# Patient Record
Sex: MALE | Race: WHITE
[De-identification: names, ages, dates, MRNs, and addresses within clinical notes are randomized per-mention and may not be internally consistent; named-entity substitution may affect disease eponyms.]

---

## 2019-04-12 ENCOUNTER — HOSPITAL ENCOUNTER (OUTPATIENT)
Dept: HOSPITAL 96 - M.LAB | Age: 69
End: 2019-04-12
Attending: FAMILY MEDICINE
Payer: COMMERCIAL

## 2019-04-12 DIAGNOSIS — K57.30: ICD-10-CM

## 2019-04-12 DIAGNOSIS — Z80.3: ICD-10-CM

## 2019-04-12 DIAGNOSIS — Z82.49: ICD-10-CM

## 2019-04-12 DIAGNOSIS — I10: ICD-10-CM

## 2019-04-12 DIAGNOSIS — Z87.891: ICD-10-CM

## 2019-04-12 DIAGNOSIS — Z80.0: ICD-10-CM

## 2019-04-12 DIAGNOSIS — F41.9: ICD-10-CM

## 2019-04-12 DIAGNOSIS — J44.9: ICD-10-CM

## 2019-04-12 DIAGNOSIS — K76.9: Primary | ICD-10-CM

## 2019-04-12 DIAGNOSIS — N40.0: ICD-10-CM

## 2019-04-12 DIAGNOSIS — K40.20: ICD-10-CM

## 2019-04-12 DIAGNOSIS — M47.816: ICD-10-CM

## 2019-04-12 DIAGNOSIS — Z79.899: ICD-10-CM

## 2019-04-12 LAB
ABSOLUTE MONOCYTES: 2.1 THOU/UL (ref 0–1.2)
ALBUMIN SERPL-MCNC: 3.9 G/DL (ref 3.4–5)
ALP SERPL-CCNC: 80 U/L (ref 46–116)
ALT SERPL-CCNC: 32 U/L (ref 30–65)
AMYLASE SERPL-CCNC: 104 U/L (ref 25–115)
ANION GAP SERPL CALC-SCNC: 7 MMOL/L (ref 7–16)
ANISOCYTOSIS BLD QL SMEAR: (no result)
AST SERPL-CCNC: 15 U/L (ref 15–37)
BILIRUB SERPL-MCNC: 1.6 MG/DL
BUN SERPL-MCNC: 24 MG/DL (ref 7–18)
CALCIUM SERPL-MCNC: 9.4 MG/DL (ref 8.5–10.1)
CHLORIDE SERPL-SCNC: 104 MMOL/L (ref 98–107)
CO2 SERPL-SCNC: 30 MMOL/L (ref 21–32)
CREAT SERPL-MCNC: 1.3 MG/DL (ref 0.6–1.3)
GLUCOSE SERPL-MCNC: 105 MG/DL (ref 70–99)
GRANULOCYTES NFR BLD MANUAL: 9 %
HCT VFR BLD CALC: 48.3 % (ref 42–52)
HGB BLD-MCNC: 15.7 GM/DL (ref 14–18)
LIPASE: 804 U/L (ref 73–393)
LYMPHOCYTES # BLD: 62.4 THOU/UL (ref 0.8–5.3)
LYMPHOCYTES NFR BLD AUTO: 84 %
MCH RBC QN AUTO: 29.2 PG (ref 26–34)
MCHC RBC AUTO-ENTMCNC: 32.4 G/DL (ref 28–37)
MCV RBC: 89.9 FL (ref 80–100)
MONOCYTES NFR BLD: 3 %
MPV: 7.1 FL. (ref 7.2–11.1)
NEUTROPHILS # BLD: 6.4 THOU/UL (ref 1.6–8.1)
NUCLEATED RBCS: 0 /100WBC
PLATELET # BLD EST: ADEQUATE 10*3/UL
PLATELET COUNT*: 172 THOU/UL (ref 150–400)
POIKILOCYTOSIS BLD QL SMEAR: (no result)
POTASSIUM SERPL-SCNC: 4.2 MMOL/L (ref 3.5–5.1)
PROT SERPL-MCNC: 7.6 G/DL (ref 6.4–8.2)
RBC # BLD AUTO: 5.37 MIL/UL (ref 4.5–6)
RDW-CV: 14.1 % (ref 10.5–14.5)
SODIUM SERPL-SCNC: 141 MMOL/L (ref 136–145)
VARIANT LYMPHS NFR BLD MANUAL: 4 %
WBC # BLD AUTO: 70.9 THOU/UL (ref 4–11)

## 2019-04-16 ENCOUNTER — HOSPITAL ENCOUNTER (INPATIENT)
Dept: HOSPITAL 96 - M.2W | Age: 69
LOS: 4 days | Discharge: HOME | DRG: 439 | End: 2019-04-20
Attending: HOSPITALIST | Admitting: HOSPITALIST
Payer: COMMERCIAL

## 2019-04-16 VITALS — SYSTOLIC BLOOD PRESSURE: 155 MMHG | DIASTOLIC BLOOD PRESSURE: 86 MMHG

## 2019-04-16 VITALS — WEIGHT: 164 LBS | BODY MASS INDEX: 22.21 KG/M2 | HEIGHT: 72.01 IN

## 2019-04-16 VITALS — SYSTOLIC BLOOD PRESSURE: 164 MMHG | DIASTOLIC BLOOD PRESSURE: 99 MMHG

## 2019-04-16 DIAGNOSIS — K82.4: ICD-10-CM

## 2019-04-16 DIAGNOSIS — E80.6: ICD-10-CM

## 2019-04-16 DIAGNOSIS — E44.1: ICD-10-CM

## 2019-04-16 DIAGNOSIS — N18.3: ICD-10-CM

## 2019-04-16 DIAGNOSIS — F90.9: ICD-10-CM

## 2019-04-16 DIAGNOSIS — F13.10: ICD-10-CM

## 2019-04-16 DIAGNOSIS — K85.90: Primary | ICD-10-CM

## 2019-04-16 DIAGNOSIS — F32.9: ICD-10-CM

## 2019-04-16 DIAGNOSIS — H91.90: ICD-10-CM

## 2019-04-16 DIAGNOSIS — Z80.0: ICD-10-CM

## 2019-04-16 DIAGNOSIS — Z79.899: ICD-10-CM

## 2019-04-16 DIAGNOSIS — F11.10: ICD-10-CM

## 2019-04-16 DIAGNOSIS — I12.9: ICD-10-CM

## 2019-04-16 DIAGNOSIS — Z85.6: ICD-10-CM

## 2019-04-16 DIAGNOSIS — F41.9: ICD-10-CM

## 2019-04-16 DIAGNOSIS — J44.9: ICD-10-CM

## 2019-04-16 DIAGNOSIS — F12.10: ICD-10-CM

## 2019-04-16 DIAGNOSIS — Z87.891: ICD-10-CM

## 2019-04-16 LAB
ABSOLUTE MONOCYTES: 1.5 THOU/UL (ref 0–1.2)
ALBUMIN SERPL-MCNC: 3.9 G/DL (ref 3.4–5)
ALP SERPL-CCNC: 77 U/L (ref 46–116)
ALT SERPL-CCNC: 27 U/L (ref 30–65)
ANION GAP SERPL CALC-SCNC: 10 MMOL/L (ref 7–16)
APTT BLD: 26.8 SECONDS (ref 25–31.3)
AST SERPL-CCNC: 17 U/L (ref 15–37)
BACTERIA-REFLEX: (no result) /HPF
BENZODIAZ UR-MCNC: POSITIVE UG/L
BILIRUB SERPL-MCNC: 1.5 MG/DL
BILIRUB UR-MCNC: NEGATIVE MG/DL
BUN SERPL-MCNC: 20 MG/DL (ref 7–18)
CALCIUM SERPL-MCNC: 8.9 MG/DL (ref 8.5–10.1)
CHLORIDE SERPL-SCNC: 103 MMOL/L (ref 98–107)
CO2 SERPL-SCNC: 25 MMOL/L (ref 21–32)
COLOR UR: YELLOW
CREAT SERPL-MCNC: 1.2 MG/DL (ref 0.6–1.3)
GLUCOSE SERPL-MCNC: 113 MG/DL (ref 70–99)
GRANULOCYTES NFR BLD MANUAL: 9 %
HCT VFR BLD CALC: 45.6 % (ref 42–52)
HGB BLD-MCNC: 15.2 GM/DL (ref 14–18)
INR PPP: 1
KETONES UR STRIP-MCNC: (no result) MG/DL
LIPASE: 154 U/L (ref 73–393)
LYMPHOCYTES # BLD: 64.7 THOU/UL (ref 0.8–5.3)
LYMPHOCYTES NFR BLD AUTO: 89 %
MAGNESIUM SERPL-MCNC: 2 MG/DL (ref 1.8–2.4)
MCH RBC QN AUTO: 29.8 PG (ref 26–34)
MCHC RBC AUTO-ENTMCNC: 33.3 G/DL (ref 28–37)
MCV RBC: 89.5 FL (ref 80–100)
MONOCYTES NFR BLD: 2 %
MPV: 7.3 FL. (ref 7.2–11.1)
NEUTROPHILS # BLD: 6.5 THOU/UL (ref 1.6–8.1)
NUCLEATED RBCS: 0 /100WBC
OPIATES UR-MCNC: POSITIVE NG/ML
PLATELET # BLD EST: ADEQUATE 10*3/UL
PLATELET COUNT*: 163 THOU/UL (ref 150–400)
POTASSIUM SERPL-SCNC: 3.7 MMOL/L (ref 3.5–5.1)
PROT SERPL-MCNC: 7.3 G/DL (ref 6.4–8.2)
PROT UR QL STRIP: (no result)
PROTHROMBIN TIME: 10.5 SECONDS (ref 9.2–11.5)
RBC # BLD AUTO: 5.1 MIL/UL (ref 4.5–6)
RBC # UR STRIP: (no result) /UL
RBC MORPH BLD: NORMAL
RDW-CV: 13.9 % (ref 10.5–14.5)
SODIUM SERPL-SCNC: 138 MMOL/L (ref 136–145)
SP GR UR STRIP: 1.02 (ref 1–1.03)
SQUAMOUS: (no result) /LPF (ref 0–3)
THC: POSITIVE
URINE CLARITY: CLEAR
URINE GLUCOSE-RANDOM: NEGATIVE
URINE LEUKOCYTES-REFLEX: NEGATIVE
URINE NITRITE-REFLEX: NEGATIVE
UROBILINOGEN UR STRIP-ACNC: 0.2 E.U./DL (ref 0.2–1)
WBC # BLD AUTO: 72.7 THOU/UL (ref 4–11)

## 2019-04-16 NOTE — NUR
PT ARRIVED ON UNIT AT APPROX 1105 VIA WC, PT DIRECT ADMIT FROM DR TEE D/T
LABWORK INDICATING PANCREATITIS. PT A&O X4, VSS, CARDIAC MONITOR TRACING SINUS
RHYTHM/SINUS MARIA INES. LS DIMINISHED IN ALL LOBES, RA. PT C/O RIGHT UPPER QUAD
PAIN 9/10, DR PEARCE NOTIFIED FOR NEW ORDERS. PT UP AD HARMEET TO BATHROOM.
ORIENTED TO CALL LIGHT AND ROOM.

## 2019-04-16 NOTE — CON
[address]  [address]                    CONSULTATION                  
_______________________________________________________________________________
 
Name:       CANDACE PULIDO         Room:           [address] [place]  
M.R.#:  [id number]      Account #:      [id number]  
Admission:  04/16/19     Attend Phys:    Urbano Neal MD    
Discharge:               Date of Birth:  [birth year]  
         Report #: [id number]
                                                                     [id number]  
_______________________________________________________________________________
THIS REPORT FOR:  //name//                      
 
CC: Urbano Aguiar DO
 
DICTATED BY: Dottie Helton [facility]
 
DATE OF SERVICE:  04/18/2019
 
 
Please note at the time of this dictation, the patient was seen and physically
examined by myself.
 
REASON FOR CONSULTATION:  Post-perennial pain.
 
HISTORY OF PRESENT ILLNESS:  This 68-year-old male who presented to the
Emergency Room after being told by his PCP that he had an elevated lipase level
from 04/14/2019, it was 804.  He states he has been having some abdominal
discomfort, which he states was usually after he ate.  He denied any nausea or
vomiting associated with this except for on Sunday, which he states was
non-coffee ground emesis or any bright red blood.  He denies getting full
quickly prior to all of this when he is eating.  The patient did undergo an
ultrasound, which was negative.  It did show some gallbladder polyps and then he
had a CCK PIPIDA that showed an EF of 39%.  The patient denies any chronic use
of any NSAIDs.  He has never had any stomach issues.  He had a colonoscopy done
last year in 2017 that just showed some hemorrhoids and diverticular disease,
otherwise negative.  He has never had an upper scope done that he can recall at
this time.
 
ALLERGIES:  No known drug allergies.
 
MEDICATIONS:  From home, see MAR.
 
PAST MEDICAL HISTORY:  COPD, hypertension, CLL, hearing impairment, depression,
anxiety, ADHD.
 
PAST SURGICAL HISTORY:  He has had a right cochlear implant and ____ surgery.
 
FAMILY HISTORY:  Mother, colon cancer.
 
SOCIAL HISTORY:  Quit smoking about 7 years ago.  Alcohol every couple of
months.  He denies any illegal drug use; however, he did test positive for THC.
 
REVIEW OF SYSTEMS:  Twelve-point review of systems is essentially negative
except what is mentioned in the HPI.
 
 
 
[place] [address]                    CONSULTATION                  
_______________________________________________________________________________
 
Name:       CANDACE PULIDO         Room:           [address] [place]  
[facility]#:  [id number]      Account #:      [id number]  
Admission:  04/16/19     Attend Phys:    Urbano Neal MD    
Discharge:               Date of Birth:  [birth year]  
         Report #: [id number]
                                                                     [id number]  
_______________________________________________________________________________
 
PHYSICAL EXAMINATION:
VITAL SIGNS:  Temperature 37.3, pulse 64, respirations 18, blood pressure
162/92.
HEART:  Regular rate and rhythm.
LUNGS:  Clear.
ABDOMEN:  Soft, positive bowel sounds in all 4 quadrants with some right upper
quadrant tenderness noted to palpation.
 
LABORATORY DATA:  Hemoglobin is 13.9, white count is 63,000, platelets 144.  PT
10.5, INR 1.2.  Total bilirubin is 2.3, alkaline phosphatase 69, ALT 19, AST is
15, direct bilirubin is 0.1.
 
IMPRESSION:
1.  Postprandial right upper quadrant pain.
2.  Pancreatitis, resolved.
3.  Elevated bilirubin.
4.  Gallbladder polyps.
5.  Chronic lymphocytic leukemia.
6.  Family history, mother colon cancer.
 
PLAN:
1.  EGD tomorrow with Dr. Disla.
2.  Regular diet.
3.  May consider a GET.
4.  Further recommendations will be made once the procedure has been performed.
 
Thank you for allowing us to participate in this patient's care.  Please do not
hesitate to call with any questions in regard to this consult.
 
ADDENDUM
 
This is a 68-year-old male who was seen and examined by myself.  I also have
reviewed labs and imaging studies.  The patient reports that he started having
acute epigastric pain with symptoms of nausea.  This prompted him to come to the
hospital.  Prior to admission, he had lipase of 800.  This had corrected by time
of admission.
 
CT of abdomen and pelvis also had shown some evidence of gallstone without any
evidence of acute cholecystitis.  The patient's liver enzymes were normal.
 
The patient reports that his pain is somewhat better, but not resolved.  We will
consider performing upper endoscopy to rule out other etiologies of dyspepsia
and pain such as H. pylori, peptic ulcer disease, esophagitis or duodenitis.  We
 
 
 
[place] [address]                    CONSULTATION                  
_______________________________________________________________________________
 
Name:       CANDACE PULIDO         Room:           [address] [place]  
.R.#:  [id number]      Account #:      [id number]  
Admission:  04/16/19     Attend Phys:    Urbano Neal MD    
Discharge:               Date of Birth:  [birth year]  
         Report #: [id number]
                                                                     [id number]  
_______________________________________________________________________________
will make further recommendation based on finding.  We believe that his
pancreatitis was due to a gallstone, which has been passed.
 
 
 
 
 
 
 
 
 
 
 
 
 
 
 
 
 
 
 
 
 
 
 
 
 
 
 
 
 
 
 
 
 
 
 
 
 
 
 
 
 
 
                       
                                        By:                                
                 
D: 04/18/19 1257_______________________________________
T: 04/19/19 0630Aminata Johnson MD               /nt

## 2019-04-16 NOTE — PROC
[facility] 
[address] [place]  [address]                    PROCEDURE REPORT              
_______________________________________________________________________________
 
Name:       CANDACE PULIDO         Room:           [address] [place]  
M.R.#:  [id number]      Account #:      [id number]  
Admission:  04/16/19     Attend Phys:    Urbano Neal MD    
Discharge:  04/20/19     Date of Birth:  [birth year]  
         Report #: [id number]
                                                                                
_______________________________________________________________________________
THIS REPORT FOR:  //name//                      
 
For GI report, please see the Provation report in Perceptive 7 content.
 
 
 
 
 
 
 
 
 
 
 
 
 
 
 
 
 
 
 
 
 
 
 
 
 
 
 
 
 
 
 
 
 
 
 
 
 
 
 
 
 
                       
                                        By:                                
                 
D: 04/19/19     _______________________________________
T: 04/25/19 0643Medical Records Staff NAOMI       /AL

## 2019-04-17 VITALS — DIASTOLIC BLOOD PRESSURE: 83 MMHG | SYSTOLIC BLOOD PRESSURE: 163 MMHG

## 2019-04-17 VITALS — DIASTOLIC BLOOD PRESSURE: 88 MMHG | SYSTOLIC BLOOD PRESSURE: 161 MMHG

## 2019-04-17 VITALS — DIASTOLIC BLOOD PRESSURE: 92 MMHG | SYSTOLIC BLOOD PRESSURE: 154 MMHG

## 2019-04-17 VITALS — SYSTOLIC BLOOD PRESSURE: 158 MMHG | DIASTOLIC BLOOD PRESSURE: 88 MMHG

## 2019-04-17 VITALS — DIASTOLIC BLOOD PRESSURE: 88 MMHG | SYSTOLIC BLOOD PRESSURE: 142 MMHG

## 2019-04-17 VITALS — DIASTOLIC BLOOD PRESSURE: 88 MMHG | SYSTOLIC BLOOD PRESSURE: 158 MMHG

## 2019-04-17 LAB
ABSOLUTE BASOPHILS: 0.1 THOU/UL (ref 0–0.2)
ABSOLUTE EOSINOPHILS: 0.1 THOU/UL (ref 0–0.7)
ABSOLUTE MONOCYTES: 1.3 THOU/UL (ref 0–1.2)
ALBUMIN SERPL-MCNC: 3.3 G/DL (ref 3.4–5)
ALP SERPL-CCNC: 62 U/L (ref 46–116)
ALT SERPL-CCNC: 19 U/L (ref 30–65)
ANION GAP SERPL CALC-SCNC: 7 MMOL/L (ref 7–16)
AST SERPL-CCNC: 15 U/L (ref 15–37)
BACTERIA #/AREA URNS HPF: (no result) /HPF
BASOPHILS NFR BLD AUTO: 0.2 %
BILIRUB SERPL-MCNC: 2 MG/DL
BILIRUB UR-MCNC: NEGATIVE MG/DL
BUN SERPL-MCNC: 16 MG/DL (ref 7–18)
CALCIUM SERPL-MCNC: 8.5 MG/DL (ref 8.5–10.1)
CHLORIDE SERPL-SCNC: 105 MMOL/L (ref 98–107)
CHOLEST SERPL-MCNC: 107 MG/DL (ref ?–200)
CO2 SERPL-SCNC: 26 MMOL/L (ref 21–32)
COLOR UR: YELLOW
CREAT SERPL-MCNC: 1.1 MG/DL (ref 0.6–1.3)
EOSINOPHIL NFR BLD: 0.2 %
GLUCOSE SERPL-MCNC: 104 MG/DL (ref 70–99)
GRANULOCYTES NFR BLD MANUAL: 6 %
HCT VFR BLD CALC: 41.9 % (ref 42–52)
HDLC SERPL-MCNC: 40 MG/DL (ref 40–?)
HGB BLD-MCNC: 13.9 GM/DL (ref 14–18)
KETONES UR STRIP-MCNC: NEGATIVE MG/DL
LDLC SERPL-MCNC: 51 MG/DL (ref ?–100)
LYMPHOCYTES # BLD: 57.6 THOU/UL (ref 0.8–5.3)
LYMPHOCYTES NFR BLD AUTO: 91.5 %
MCH RBC QN AUTO: 29.8 PG (ref 26–34)
MCHC RBC AUTO-ENTMCNC: 33.3 G/DL (ref 28–37)
MCV RBC: 89.6 FL (ref 80–100)
MONOCYTES NFR BLD: 2.1 %
MPV: 7.6 FL. (ref 7.2–11.1)
NEUTROPHILS # BLD: 3.8 THOU/UL (ref 1.6–8.1)
NITRITE UR QL STRIP: NEGATIVE
NUCLEATED RBCS: 0 /100WBC
PLATELET COUNT*: 144 THOU/UL (ref 150–400)
POTASSIUM SERPL-SCNC: 4.1 MMOL/L (ref 3.5–5.1)
PROT SERPL-MCNC: 6.3 G/DL (ref 6.4–8.2)
PROT UR QL STRIP: NEGATIVE
RBC # BLD AUTO: 4.67 MIL/UL (ref 4.5–6)
RBC # UR STRIP: (no result) /UL
RBC #/AREA URNS HPF: (no result) /HPF (ref 0–2)
RDW-CV: 13.6 % (ref 10.5–14.5)
SODIUM SERPL-SCNC: 138 MMOL/L (ref 136–145)
SP GR UR STRIP: <= 1.005 (ref 1–1.03)
SQUAMOUS: (no result) /LPF (ref 0–3)
TC:HDL: 2.7 RATIO
TRIGL SERPL-MCNC: 80 MG/DL (ref ?–150)
URINE CLARITY: CLEAR
URINE GLUCOSE-RANDOM: NEGATIVE
URINE LEUKOCYTES: NEGATIVE
UROBILINOGEN UR STRIP-ACNC: 0.2 E.U./DL (ref 0.2–1)
VLDLC SERPL CALC-MCNC: 16 MG/DL (ref ?–40)
WBC # BLD AUTO: 63 THOU/UL (ref 4–11)
WBC #/AREA URNS HPF: (no result) /HPF (ref 0–5)

## 2019-04-17 NOTE — NUR
Pt is A&O. Resides at home with his girlfriend. Normally active and
independent. No DME. No hx of HH or SNF. Pt states that he may have to have
his gallbladder removed, during this hospital stay. Goal is home at dc, no
needs anticipated. Following

## 2019-04-17 NOTE — NUR
ASSUMED PT CARE AT APPROX 1930. PT IS AWAKE AND ORIENTED X4. VSS ON ROOM AIR.
CARDIAC MONITOR IN PLACE TRACING SR/SB. PT IS HARD OF HEARING. DENIES SOA AND
CHEST PAIN.  RE-ASSESSMENT DONE AND CHARTED. PT C/O RUQ ABDOMINAL PAIN
RELIEVED BY PAIN MEDS GIVEN PER MAR. PT WAS ABLE TO SLEEP THROUGH THE NIGHT.
CALL LIGHT WITHIN REACH. HOURLY ROUNDING DONE FOR PT SAFETY.

## 2019-04-17 NOTE — EKG
[place] [address]
Phone:  [phone]                     ELECTROCARDIOGRAM REPORT      
_______________________________________________________________________________
 
Name:       CANDACE PULIDO         Room:           [address]    [place]  
M.R.#:  [id number]      Account #:      [id number]  
Admission:  19     Attend Phys:    Urbano Neal MD    
Discharge:               Date of Birth:  [birth year]  
         Report #: [id number]
    [id number]
_______________________________________________________________________________
THIS REPORT FOR:  //name//                      
 
                          [facility]
                                       
Test Date:    2019               Test Time:    14:38:30
Pat Name:     CANDACE PULIDO           Department:   
Patient ID:   [id number]            Room:         [address]
Gender:       M                        Technician:   [facility]
:          1950               Requested By: Urbano Neal
Order Number: [id number]    Umm MD:   Jeremy Booker
                                 Measurements
Intervals                              Axis          
Rate:         61                       P:            30
AZ:           149                      QRS:          10
QRSD:         117                      T:            -20
QT:           472                                    
QTc:          476                                    
                           Interpretive Statements
Sinus rhythm
Nonspecific intraventricular conduction delay
Borderline repolarization abnormality
No previous ECG available for comparison
 
Electronically Signed On 2019 14:32:14 CDT by Jeremy Booker
[email or web address]
 
 
 
 
 
 
 
 
 
 
 
 
 
 
 
 
 
 
  <ELECTRONICALLY SIGNED>
                                           By: Jeremy Booker MD, [facility]     
  19     1432
D: 19 1438   _____________________________________
T: 19 1438   Jeremy oBoker MD, FACC       /EPI

## 2019-04-17 NOTE — NUR
PT IN BED, CALL LIGHT IN REACH. A&O X4, UP AD HARMEET, CARDIAC MONITOR TRACING
SINUS MARIA INES. CONT TO C/O ABD PAIN IN RIGHT UPPER QUAD, PT EDUCATED ON US
FINDINGS, CONT ON CLEAR LIQUID DIET, PRN PAIN MEDS ON BOARD. WILL CONT POC.

## 2019-04-17 NOTE — NUR
PT A&O X4, PIPIDA SCAN CLEAR, DENIES ANY N/V, C/O SLIGHT PAIN TO RIGHT UPPER
QUAD IN AM, PRN PAIN MEDS ON BOARD. UP AD HARMEET, VSS, CARDIAC MONITOR TRACING
SINUS MARIA INES. HOURLY ROUNDING COMPLETED, PER DR MARTÍNEZ, PT REMAINS ON FULL
LIQUID DIET.

## 2019-04-18 VITALS — SYSTOLIC BLOOD PRESSURE: 138 MMHG | DIASTOLIC BLOOD PRESSURE: 85 MMHG

## 2019-04-18 VITALS — SYSTOLIC BLOOD PRESSURE: 154 MMHG | DIASTOLIC BLOOD PRESSURE: 88 MMHG

## 2019-04-18 VITALS — SYSTOLIC BLOOD PRESSURE: 162 MMHG | DIASTOLIC BLOOD PRESSURE: 92 MMHG

## 2019-04-18 VITALS — DIASTOLIC BLOOD PRESSURE: 93 MMHG | SYSTOLIC BLOOD PRESSURE: 150 MMHG

## 2019-04-18 VITALS — SYSTOLIC BLOOD PRESSURE: 166 MMHG | DIASTOLIC BLOOD PRESSURE: 102 MMHG

## 2019-04-18 VITALS — DIASTOLIC BLOOD PRESSURE: 84 MMHG | SYSTOLIC BLOOD PRESSURE: 150 MMHG

## 2019-04-18 LAB
ALBUMIN SERPL-MCNC: 3.6 G/DL (ref 3.4–5)
ALP SERPL-CCNC: 69 U/L (ref 46–116)
ALT SERPL-CCNC: 23 U/L (ref 30–65)
ANION GAP SERPL CALC-SCNC: 8 MMOL/L (ref 7–16)
AST SERPL-CCNC: 14 U/L (ref 15–37)
BILIRUB SERPL-MCNC: 2.3 MG/DL
BUN SERPL-MCNC: 13 MG/DL (ref 7–18)
CALCIUM SERPL-MCNC: 8.5 MG/DL (ref 8.5–10.1)
CHLORIDE SERPL-SCNC: 104 MMOL/L (ref 98–107)
CO2 SERPL-SCNC: 29 MMOL/L (ref 21–32)
CREAT SERPL-MCNC: 1.3 MG/DL (ref 0.6–1.3)
EST. AVERAGE GLUCOSE BLD GHB EST-MCNC: 117 MG/DL
GLUCOSE SERPL-MCNC: 92 MG/DL (ref 70–99)
GLYCOHEMOGLOBIN (HGB A1C): 5.7 % (ref 4.8–5.6)
HCT VFR BLD CALC: 43.1 % (ref 42–52)
HGB BLD-MCNC: 14.2 GM/DL (ref 14–18)
MAGNESIUM SERPL-MCNC: 2.1 MG/DL (ref 1.8–2.4)
MCH RBC QN AUTO: 29.7 PG (ref 26–34)
MCHC RBC AUTO-ENTMCNC: 33.1 G/DL (ref 28–37)
MCV RBC: 89.6 FL (ref 80–100)
MPV: 7 FL. (ref 7.2–11.1)
PHOSPHATE SERPL-MCNC: 3.9 MG/DL (ref 2.5–4.9)
PLATELET COUNT*: 144 THOU/UL (ref 150–400)
POTASSIUM SERPL-SCNC: 4 MMOL/L (ref 3.5–5.1)
PROT SERPL-MCNC: 6.5 G/DL (ref 6.4–8.2)
RBC # BLD AUTO: 4.8 MIL/UL (ref 4.5–6)
RDW-CV: 13.6 % (ref 10.5–14.5)
SODIUM SERPL-SCNC: 141 MMOL/L (ref 136–145)
WBC # BLD AUTO: 61.1 THOU/UL (ref 4–11)

## 2019-04-18 NOTE — NUR
VSS, UP AD HARMEET, HOURLY ROUNDING COMPLETED. NPO AT MIDNIGHT FOR GI PROCEDURE.
DENIES ANY N/V OR PAIN AT THIS TIME.

## 2019-04-18 NOTE — NUR
ASSUMED PT CARE AT APPROX 1930. PT IS AWAKE AND ORIENTED X4. VSS ON ROOM AIR.
CARDIAC MONITOR IN PLACE TRACING TELE. ABLE TO TOLERATE CLEAR LIQUIDS, DENIES
N/V. VERBALIZED THAT HE'S FEELING BETTER. C/O RUQ ABDOMINAL PAIN BUT SAID IS
NOT AS BAD AS BEFORE, RELIEVED BY PAIN MEDS GIVEN PER MAR. CALL LIGHT WITHIN
REACH. HOURLY ROUNDING DONE FOR PT SAFETY.

## 2019-04-18 NOTE — NUR
ASSUMED PT CARE AT 0700, PT LYING IN BED, A&O X4, UP AD HARMEET, CARDIAC MONITOR
TRACING SINUS MARIA INES. PIPIDA SCAN NEGATIVE, GI CONSULTED, PT REMAINS ON FULL
LIQUID DIET UNTIL FURTHER NOTICE. DENIES ANY N/V, C/O PAIN TO RIGHT UPPER QUAD
6/10, PRN PAIN MEDS ON BOARD. WILL CONT POC.

## 2019-04-19 VITALS — SYSTOLIC BLOOD PRESSURE: 180 MMHG | DIASTOLIC BLOOD PRESSURE: 94 MMHG

## 2019-04-19 VITALS — SYSTOLIC BLOOD PRESSURE: 117 MMHG | DIASTOLIC BLOOD PRESSURE: 69 MMHG

## 2019-04-19 VITALS — DIASTOLIC BLOOD PRESSURE: 99 MMHG | SYSTOLIC BLOOD PRESSURE: 181 MMHG

## 2019-04-19 VITALS — SYSTOLIC BLOOD PRESSURE: 163 MMHG | DIASTOLIC BLOOD PRESSURE: 83 MMHG

## 2019-04-19 VITALS — SYSTOLIC BLOOD PRESSURE: 171 MMHG | DIASTOLIC BLOOD PRESSURE: 83 MMHG

## 2019-04-19 VITALS — SYSTOLIC BLOOD PRESSURE: 154 MMHG | DIASTOLIC BLOOD PRESSURE: 91 MMHG

## 2019-04-19 VITALS — DIASTOLIC BLOOD PRESSURE: 96 MMHG | SYSTOLIC BLOOD PRESSURE: 170 MMHG

## 2019-04-19 LAB
ALBUMIN SERPL-MCNC: 3.4 G/DL (ref 3.4–5)
ALP SERPL-CCNC: 66 U/L (ref 46–116)
ALT SERPL-CCNC: 22 U/L (ref 30–65)
ANION GAP SERPL CALC-SCNC: 9 MMOL/L (ref 7–16)
AST SERPL-CCNC: 14 U/L (ref 15–37)
BILIRUB SERPL-MCNC: 1.5 MG/DL
BUN SERPL-MCNC: 17 MG/DL (ref 7–18)
CALCIUM SERPL-MCNC: 8.3 MG/DL (ref 8.5–10.1)
CHLORIDE SERPL-SCNC: 104 MMOL/L (ref 98–107)
CO2 SERPL-SCNC: 26 MMOL/L (ref 21–32)
CREAT SERPL-MCNC: 1.2 MG/DL (ref 0.6–1.3)
GLUCOSE SERPL-MCNC: 109 MG/DL (ref 70–99)
MAGNESIUM SERPL-MCNC: 1.9 MG/DL (ref 1.8–2.4)
PHOSPHATE SERPL-MCNC: 3.5 MG/DL (ref 2.5–4.9)
POTASSIUM SERPL-SCNC: 4.3 MMOL/L (ref 3.5–5.1)
PROT SERPL-MCNC: 6.4 G/DL (ref 6.4–8.2)
SODIUM SERPL-SCNC: 139 MMOL/L (ref 136–145)

## 2019-04-19 PROCEDURE — 0DJ08ZZ INSPECTION OF UPPER INTESTINAL TRACT, VIA NATURAL OR ARTIFICIAL OPENING ENDOSCOPIC: ICD-10-PCS | Performed by: INTERNAL MEDICINE

## 2019-04-19 NOTE — NUR
PT. STABLE THROUG OUT SHIFT.  TOLERATED CLEAR LIQUID DIET THIS EVENING.  PT.
HAS SLEPT GOOD PORTION OF AFTERNOON SINCE RETURNING FROM EGD.  S.O. REMAINS AT
BEDSIDE.  HOURLY ROUNDING COMPLETED FOR PT. SAFETY.

## 2019-04-19 NOTE — NUR
PT HYDROCODONE ORDERED Q 6 HRS. PTS WIFE ASKED NURSE TO CALL DR FOR EXTRA PAIN
MEDICATION. PT RATED PAIN 5/10. PT LAYING IN BED WITH KNEES DRAWN TO CHEST
ROLLING ON BED. DR ORDERED ONE EXTRA DOSE OF HYDROCODONE AND A URINE DRG
SCREEN COLLECTED AND SENT TO LAB. TELEMETRY SHOWS SR. NPO AT MN FOR EGD.

## 2019-04-19 NOTE — NUR
ASSUMED PT. CARE AND RECEIVED REPORT AT 0730.  PT. A/OX4, BP ELEVATED AT
170/96 OTHERWISE VSS.  MONITOR ON TRACING SB PVC.  PT. REPORTS PAIN IN ABD.
5/10.  FULL ASSESSMENT COMPLETED, REFER TO CHARTING.  PT. GIVE PO BP MEDS, PT.
TAKEN BY PACU RN FOR EGD.  PT. WIFE AT BEDSIDE.

## 2019-04-20 VITALS — DIASTOLIC BLOOD PRESSURE: 87 MMHG | SYSTOLIC BLOOD PRESSURE: 161 MMHG

## 2019-04-20 VITALS — SYSTOLIC BLOOD PRESSURE: 161 MMHG | DIASTOLIC BLOOD PRESSURE: 87 MMHG

## 2019-04-20 VITALS — SYSTOLIC BLOOD PRESSURE: 172 MMHG | DIASTOLIC BLOOD PRESSURE: 89 MMHG

## 2019-04-20 VITALS — DIASTOLIC BLOOD PRESSURE: 72 MMHG | SYSTOLIC BLOOD PRESSURE: 150 MMHG

## 2019-04-20 LAB
ALBUMIN SERPL-MCNC: 3.3 G/DL (ref 3.4–5)
ALP SERPL-CCNC: 62 U/L (ref 46–116)
ALT SERPL-CCNC: 23 U/L (ref 30–65)
ANION GAP SERPL CALC-SCNC: 11 MMOL/L (ref 7–16)
AST SERPL-CCNC: 16 U/L (ref 15–37)
BILIRUB SERPL-MCNC: 2 MG/DL
BUN SERPL-MCNC: 12 MG/DL (ref 7–18)
CALCIUM SERPL-MCNC: 8.2 MG/DL (ref 8.5–10.1)
CHLORIDE SERPL-SCNC: 104 MMOL/L (ref 98–107)
CO2 SERPL-SCNC: 25 MMOL/L (ref 21–32)
CREAT SERPL-MCNC: 1.1 MG/DL (ref 0.6–1.3)
GLUCOSE SERPL-MCNC: 92 MG/DL (ref 70–99)
HCT VFR BLD CALC: 40.3 % (ref 42–52)
HGB BLD-MCNC: 13.5 GM/DL (ref 14–18)
MAGNESIUM SERPL-MCNC: 2 MG/DL (ref 1.8–2.4)
MCH RBC QN AUTO: 29.8 PG (ref 26–34)
MCHC RBC AUTO-ENTMCNC: 33.5 G/DL (ref 28–37)
MCV RBC: 89 FL (ref 80–100)
MPV: 7.5 FL. (ref 7.2–11.1)
PHOSPHATE SERPL-MCNC: 3.6 MG/DL (ref 2.5–4.9)
PLATELET COUNT*: 136 THOU/UL (ref 150–400)
POTASSIUM SERPL-SCNC: 3.7 MMOL/L (ref 3.5–5.1)
PROT SERPL-MCNC: 6 G/DL (ref 6.4–8.2)
RBC # BLD AUTO: 4.52 MIL/UL (ref 4.5–6)
RDW-CV: 13.6 % (ref 10.5–14.5)
SODIUM SERPL-SCNC: 140 MMOL/L (ref 136–145)
WBC # BLD AUTO: 56.8 THOU/UL (ref 4–11)

## 2019-04-20 NOTE — NUR
ASSUMED CARE OF PATIENT THIS AM AT 0730.  PATIENT IS ALERT AND ORIENTED X 4.
HE C/O CONTINUED ABD PAIN THIS AM.  PATIENT MEDICATED FOR PAIN.  HE DENIES
N/V.  TELE SHOWS NSR.  IV FLUIDS CONTINUED PER ORDER.  WILL CONTINUE TO
MONITOR COMFORT.

## 2019-04-20 NOTE — NUR
PT IS ABLE TO COMMUNICATE HIS NEEDS TO STAFF WITH MINOR DIFFICULTY; HE IS
HARD-OF-HEARING. CURRENT PAIN MEDICATION REGIMEN HAS BEEN ADEQUATE FOR
CONTROLLING HIS PAIN UP TO THIS TIME. FULL LIQUID DIET AT THIS TIME. POSSIBLE
DISCHARGE LATER TODAY.

## 2019-09-25 ENCOUNTER — HOSPITAL ENCOUNTER (OUTPATIENT)
Dept: HOSPITAL 96 - M.ULTRA | Age: 69
End: 2019-09-25
Attending: FAMILY MEDICINE
Payer: COMMERCIAL

## 2019-09-25 DIAGNOSIS — K76.89: Primary | ICD-10-CM

## 2019-11-19 ENCOUNTER — HOSPITAL ENCOUNTER (OUTPATIENT)
Dept: HOSPITAL 96 - M.CT | Age: 69
End: 2019-11-19
Payer: COMMERCIAL

## 2019-11-19 DIAGNOSIS — R91.8: Primary | ICD-10-CM

## 2021-05-05 ENCOUNTER — HOSPITAL ENCOUNTER (OUTPATIENT)
Dept: HOSPITAL 96 - M.ULTRA | Age: 71
End: 2021-05-05
Attending: PHYSICIAN ASSISTANT
Payer: COMMERCIAL

## 2021-05-05 DIAGNOSIS — K76.89: Primary | ICD-10-CM

## 2021-05-05 DIAGNOSIS — R31.9: ICD-10-CM

## 2021-11-06 ENCOUNTER — HOSPITAL ENCOUNTER (OUTPATIENT)
Dept: HOSPITAL 96 - M.SLEEPLAB | Age: 71
End: 2021-11-06
Attending: INTERNAL MEDICINE
Payer: COMMERCIAL

## 2021-11-06 DIAGNOSIS — G47.19: Primary | ICD-10-CM

## 2021-11-06 DIAGNOSIS — R09.02: ICD-10-CM

## 2021-12-07 NOTE — SLEEP
[address]  [address]                    SLEEP STUDY REPORT            
_______________________________________________________________________________
 
Name:       CANDACE PULIDO         Room:                      [facility] 
M.R.#:  [id number]      Account #:      [id number]  
Admission:  11/06/21     Attend Phys:    Andrew Ledezma MD     
Discharge:               Date of Birth:  [birth year]  
         Report #: [id number]
                                                                     [id number]
_______________________________________________________________________________
THIS REPORT FOR:  
 
cc:  Vinicio Aguiar Vincent R. DO Pervez, Adeel MD                                                   ~
 
 
DATE OF STUDY: 11/06/2021
 
SLEEP STUDY
 
INDICATION FOR SLEEP STUDY:  Daytime sleepiness.
 
INTERPRETATION:  Total duration of the study is 428 minutes and during this time
duration, the patient was asleep for 262 minutes with an overall sleep 
efficiency of 61.3%.  Sleep onset occurred 11 minutes after lying down in bed.  
There is no REM sleep recorded during the sleep study.  N1 sleep duration is 
15%, N2 duration is 84%, N3 duration is 1%.  There are rare sleep-related 
respiratory events recorded.  These include 3 hypopneas and 2 respiratory 
effort-related arousals.  Overall, apnea-hypopnea index is 0.7.  Body position 
data indicates the patient is observed asleep in the supine position for 207 
minutes, the rest of the time, the patient is on the right side.  There is no 
obvious positional variation.  Mean heart rate is 60.  Periodic limb movement 
index is normal at 0.9.  Arousal index is normal at 9.2. O2 saturations are 
adequately maintained throughout the sleep study.
 
IMPRESSION:  There are rare sleep-related respiratory events recorded.  However,
the patient's overall apnea-hypopnea index is normal at 0.7 and O2 saturation is
adequately maintained during the sleep study. Obstructive sleep apnea was not 
detected during the sleep study.
 
RECOMMENDATIONS:  We will follow up in the office and evaluate further his sleep
complaints. As above, this sleep study did not detect obstructive sleep apnea, 
but it is limited by reduction in sleep efficiency to 61.3% and also the absence
of REM sleep and therefore if the clinical suspicion of obstructive sleep apnea 
remains high, then I may consider another sleep study later to evaluate further.
 
His entire sleep study reviewed by board certified sleep physician.
 
 
 
 
 
 
<ELECTRONICALLY SIGNED>
                                        By:  Andrew Ledezma MD              
12/07/21     1414
D: 12/06/21 2134_______________________________________
T: 12/06/21 2232Aclyde Ledezma MD                 /nt